# Patient Record
Sex: MALE | Race: WHITE | NOT HISPANIC OR LATINO
[De-identification: names, ages, dates, MRNs, and addresses within clinical notes are randomized per-mention and may not be internally consistent; named-entity substitution may affect disease eponyms.]

---

## 2021-03-22 PROBLEM — Z00.00 ENCOUNTER FOR PREVENTIVE HEALTH EXAMINATION: Status: ACTIVE | Noted: 2021-03-22

## 2021-03-29 ENCOUNTER — APPOINTMENT (OUTPATIENT)
Dept: OTOLARYNGOLOGY | Facility: CLINIC | Age: 56
End: 2021-03-29
Payer: COMMERCIAL

## 2021-03-29 VITALS
HEIGHT: 68 IN | BODY MASS INDEX: 27.28 KG/M2 | TEMPERATURE: 97.5 F | DIASTOLIC BLOOD PRESSURE: 72 MMHG | SYSTOLIC BLOOD PRESSURE: 107 MMHG | WEIGHT: 180 LBS

## 2021-03-29 DIAGNOSIS — Z86.79 PERSONAL HISTORY OF OTHER DISEASES OF THE CIRCULATORY SYSTEM: ICD-10-CM

## 2021-03-29 DIAGNOSIS — Z82.49 FAMILY HISTORY OF ISCHEMIC HEART DISEASE AND OTHER DISEASES OF THE CIRCULATORY SYSTEM: ICD-10-CM

## 2021-03-29 DIAGNOSIS — J34.2 DEVIATED NASAL SEPTUM: ICD-10-CM

## 2021-03-29 DIAGNOSIS — H91.93 UNSPECIFIED HEARING LOSS, BILATERAL: ICD-10-CM

## 2021-03-29 DIAGNOSIS — Z86.711 PERSONAL HISTORY OF PULMONARY EMBOLISM: ICD-10-CM

## 2021-03-29 DIAGNOSIS — H90.5 UNSPECIFIED SENSORINEURAL HEARING LOSS: ICD-10-CM

## 2021-03-29 DIAGNOSIS — H60.61 UNSPECIFIED CHRONIC OTITIS EXTERNA, RIGHT EAR: ICD-10-CM

## 2021-03-29 DIAGNOSIS — J31.0 CHRONIC RHINITIS: ICD-10-CM

## 2021-03-29 DIAGNOSIS — Z78.9 OTHER SPECIFIED HEALTH STATUS: ICD-10-CM

## 2021-03-29 DIAGNOSIS — Z83.3 FAMILY HISTORY OF DIABETES MELLITUS: ICD-10-CM

## 2021-03-29 PROCEDURE — 92557 COMPREHENSIVE HEARING TEST: CPT

## 2021-03-29 PROCEDURE — 99072 ADDL SUPL MATRL&STAF TM PHE: CPT

## 2021-03-29 PROCEDURE — 99203 OFFICE O/P NEW LOW 30 MIN: CPT | Mod: 25

## 2021-03-29 PROCEDURE — 92550 TYMPANOMETRY & REFLEX THRESH: CPT

## 2021-03-29 RX ORDER — ROSUVASTATIN CALCIUM 5 MG/1
5 TABLET, FILM COATED ORAL
Qty: 60 | Refills: 0 | Status: ACTIVE | COMMUNITY
Start: 2021-02-02

## 2021-03-29 NOTE — ASSESSMENT
[FreeTextEntry1] : Reviewed and reconciled medications, allergies, PMHx, PSHx, SocHx, FMHx.\par \par progressive hearing loss\par subcutaneous growth right anterior canal wall\par \par Audio: bilateral mild SNHL 250-2k hz rising back to WNL, right 100% discrimination at 70 db, left 100% discrimination at 65 db, right -54, left -20\par \par Plan:\par Audio - results interpreted by Dr. Caballero and reviewed with the patient. Consider amplification. Yearly audio recheck.

## 2021-03-29 NOTE — CONSULT LETTER
[Dear  ___] : Dear  [unfilled], [Consult Letter:] : I had the pleasure of evaluating your patient, [unfilled]. [Please see my note below.] : Please see my note below. [Consult Closing:] : Thank you very much for allowing me to participate in the care of this patient.  If you have any questions, please do not hesitate to contact me. [Sincerely,] : Sincerely, [FreeTextEntry3] : Jaydon Caballero MD FACS

## 2021-03-29 NOTE — PHYSICAL EXAM
[Hearing Nicholson Test (Tuning Fork On Forehead)] : no lateralization of tone [Midline] : trachea is located in midline position [Clear / Open well] : hypopharynx is clear and opens well [Removed] : previously removed [Normal] : no rashes [FreeTextEntry8] : cerumen removed via curettage, narrowing of the lateral canal, subcutaneous growth anterior canal wall [FreeTextEntry9] : minimal cerumen removed via curettage  [FreeTextEntry1] : uvula elongated [FreeTextEntry2] : sinuses nontender to percussion

## 2021-03-29 NOTE — ADDENDUM
[FreeTextEntry1] : Documented by Malu Glaser acting as scribe for Dr. Caballero on 03/29/2021.\par \par All Medical record entries made by the Scribe were at my, Dr. Caballero, direction and personally dictated by me on 03/29/2021 . I have reviewed the chart and agree that the record accurately reflects my personal performance of the history, physical exam, assessment and plan. I have also personally directed, reviewed, and agreed with the discharge instructions.

## 2021-03-29 NOTE — HISTORY OF PRESENT ILLNESS
[de-identified] : The patient presents with a progressive hearing loss. Pt states that his wife is complaining that he can't hear well for the past year. She told him she thinks it's getting progressively worse. The TV volume is up loud. Denies noticing a problem hearing in group situations. Denies any h/o ear problems, noise exposure, tinnitus, head trauma. FMHx of hearing loss with pt's grandmother at an older age.

## 2022-05-22 ENCOUNTER — TRANSCRIPTION ENCOUNTER (OUTPATIENT)
Age: 57
End: 2022-05-22

## 2022-05-23 ENCOUNTER — RESULT REVIEW (OUTPATIENT)
Age: 57
End: 2022-05-23

## 2022-05-23 ENCOUNTER — OUTPATIENT (OUTPATIENT)
Dept: OUTPATIENT SERVICES | Facility: HOSPITAL | Age: 57
LOS: 1 days | End: 2022-05-23
Payer: COMMERCIAL

## 2022-05-23 DIAGNOSIS — Z12.11 ENCOUNTER FOR SCREENING FOR MALIGNANT NEOPLASM OF COLON: ICD-10-CM

## 2022-05-23 PROCEDURE — 88305 TISSUE EXAM BY PATHOLOGIST: CPT

## 2022-05-23 PROCEDURE — 45380 COLONOSCOPY AND BIOPSY: CPT | Mod: PT

## 2022-05-23 PROCEDURE — 88305 TISSUE EXAM BY PATHOLOGIST: CPT | Mod: 26

## 2022-05-23 DEVICE — CLIP RESOLUTION 360 235CM: Type: IMPLANTABLE DEVICE | Status: FUNCTIONAL

## 2022-12-31 ENCOUNTER — RESULT CHARGE (OUTPATIENT)
Age: 57
End: 2022-12-31

## 2022-12-31 ENCOUNTER — APPOINTMENT (OUTPATIENT)
Dept: ORTHOPEDIC SURGERY | Facility: CLINIC | Age: 57
End: 2022-12-31
Payer: COMMERCIAL

## 2022-12-31 VITALS — WEIGHT: 185 LBS | HEIGHT: 68 IN | BODY MASS INDEX: 28.04 KG/M2

## 2022-12-31 PROCEDURE — 72100 X-RAY EXAM L-S SPINE 2/3 VWS: CPT

## 2022-12-31 PROCEDURE — 99203 OFFICE O/P NEW LOW 30 MIN: CPT

## 2022-12-31 RX ORDER — HYDROCODONE BITARTRATE AND ACETAMINOPHEN 5; 325 MG/1; MG/1
5-325 TABLET ORAL
Qty: 20 | Refills: 0 | Status: ACTIVE | COMMUNITY
Start: 2022-12-31 | End: 1900-01-01

## 2022-12-31 RX ORDER — NAPROXEN 500 MG/1
500 TABLET ORAL
Qty: 40 | Refills: 0 | Status: ACTIVE | COMMUNITY
Start: 2022-12-31 | End: 1900-01-01

## 2022-12-31 RX ORDER — CYCLOBENZAPRINE HYDROCHLORIDE 5 MG/1
5 TABLET, FILM COATED ORAL 3 TIMES DAILY
Qty: 30 | Refills: 0 | Status: ACTIVE | COMMUNITY
Start: 2022-12-31 | End: 1900-01-01

## 2022-12-31 NOTE — DISCUSSION/SUMMARY
[de-identified] : Options were discussed. He will take the Nap 500 BID,\par Flexeril and Norco PRN. Discussed SE (drowsiness etc, no ETOH while using)\par Heating pad\par WBAT\par f/u with [facility] spine in 3-4 weeks for repeat eval.\par All questions were answered.

## 2022-12-31 NOTE — PHYSICAL EXAM
[No bony abnormalities] : No bony abnormalities [de-identified] : Constitutional:  The patient appears well developed, well nourished.  Examination of patients ability to communicate functionally was normal. \par \par Neurologic: Coordination is normal.  Alert and oriented to time, place and person.  No evidence of mood disorder, calm affect. \par \par Back, including spine: Inspection of the thoracic/lumbar spine is as follows: No atrophy, erythema, ecchymosis, masses, skin discoloration, swelling and rashes. \par \par Palpation of the thoracic/lumbar spine is as follows: left lumbar paraspinal spasm, left lumbar paraspinal tenderness. no thoracic paraspinal spasm, no thoracic paraspinal tenderness, no tenderness over left sciatic nerve and no tenderness over right sciatic nerve. \par \par Range of motion of the thoracic and lumbar spine is as follows in degrees: Diminished range of motion in all planes and Pain with lateral rotation. pain at extremes of flexion, stiffness at extremes of flexion, pain at extremes extension, stiffness at extremes extentions, stiffness with bending to the right and stiffness with bending to the left. \par Lumbar Forward Flexion 10\par Lumbar Extension 5  \par \par \par Strength Testing of the thoracic and lumbar spine is as follows: motor exam is 5/5 throughout both lower extremities with normal tone and motor exam is non-focal throughout both lower extremities \par \par Special testing of the thoracic and lumbar spine is as follows: negative straight leg raise on left side, negative straight leg raise on right side, negative sitting straight leg raise on right and negative sitting straight leg raise on left \par \par Neurological testing of the thoracic and lumbar spine is as follows: light touch is intact throughout both lower extremities, achillies and patella reflexes are symetrical, \par \par Gait and function is as follows: mildly antalgic gait.

## 2022-12-31 NOTE — HISTORY OF PRESENT ILLNESS
[Sudden] : sudden [9] : 9 [3] : 3 [Localized] : localized [Throbbing] : throbbing [Rest] : rest [de-identified] : Patient c/o low back pain Left sided since playing pickelball yesterday. Denies prior low back pain,surgery/MRI.  Patient states over the past day pain has become worse and patient having difficulty bending forward.  Denies any paresthesias,weakness, or B/B incontinence.   [] : no [FreeTextEntry1] : L spine  [FreeTextEntry3] : 12/30/22  [FreeTextEntry5] : Pt states no injury has occurred but was playing pickle ball and afterwards started feeling pain.  [de-identified] : activity

## 2023-01-27 ENCOUNTER — APPOINTMENT (OUTPATIENT)
Dept: ORTHOPEDIC SURGERY | Facility: CLINIC | Age: 58
End: 2023-01-27
Payer: COMMERCIAL

## 2023-01-27 VITALS — WEIGHT: 185 LBS | HEIGHT: 68 IN | BODY MASS INDEX: 28.04 KG/M2

## 2023-01-27 DIAGNOSIS — S33.5XXA SPRAIN OF LIGAMENTS OF LUMBAR SPINE, INITIAL ENCOUNTER: ICD-10-CM

## 2023-01-27 PROCEDURE — 99214 OFFICE O/P EST MOD 30 MIN: CPT

## 2023-01-27 NOTE — IMAGING
[de-identified] : Spine:\par Inspection/Palpation:\par No tenderness to palpation throughout Cervical/thoracic/lumbar spine.\par No bony stepoffs, No lesions.\par \par Gait:\par Non-antalgic, able to perform bilateral toe and heel rise.  Able to perform tandem gait.  \par \par Range of Motion:\par Lumbar Spine: Flexion to 90 degrees, Extension to 30 degrees, rotation 30 degrees bilaterally, lateral flexion to 30 degrees bilaterally.\par \par Neurologic:\par \par Bilateral Lower Extremities 5/5 Iliopsoas/Quadriceps/Hamstrings/ Tibialis Anterior/ Gastrocnemius. Extensor Hallucis Longus/ Flexor Hallucis Longus except \par \par \par Sensation intact to light touch L2-S1\par \par Patellar/ Achilles reflex within normal limits.\par \par \par Negative straight leg raise\par \par No pain with IR/ER/Flexion of HIps bilaterally \par \par X-ray Ap/Lateral of lumbar spine from today were viewed and interpreted.  Normal alignment is maintained without any spondylolisthesis.

## 2023-01-27 NOTE — ASSESSMENT
[FreeTextEntry1] : 57 M with low back sprain strain.  mostly better but still has some pain/.stiffness with extremes of motion\par PT\par FU 2 months\par if pain persists MRI L spine \par NSAIDS PRN

## 2023-01-27 NOTE — HISTORY OF PRESENT ILLNESS
[Lower back] : lower back [Localized] : localized [Rest] : rest [1] : 2 [0] : 0 [Dull/Aching] : dull/aching [de-identified] : 01/27/2023: Patient is a 57 y.o M who presents today for a new injury visit for the lower back. States he has been having lower back pain for 1 month associated with playing pickle ball. Went to [facility] urgent care and had xrays done. States pain has improved  since last visit. Has been taking naproxen as needed for the pain.  [] : Post Surgical Visit: no [FreeTextEntry3] : 12/30/22

## 2023-03-24 ENCOUNTER — APPOINTMENT (OUTPATIENT)
Dept: ORTHOPEDIC SURGERY | Facility: CLINIC | Age: 58
End: 2023-03-24

## (undated) DEVICE — FORMALIN CUPS 10% BUFFERED

## (undated) DEVICE — CANISTER SUCTION 1200CC 10/SL

## (undated) DEVICE — CATH ELCTR GLIDE PRB 7FR

## (undated) DEVICE — PACK IV START WITH CHG

## (undated) DEVICE — VALVE BIOPSY

## (undated) DEVICE — RETRIEVER ROTH NET PLATINUM-UNIVERSAL

## (undated) DEVICE — SNARE POLYP SENS 27MM 240CM

## (undated) DEVICE — ELCTR GROUNDING PAD ADULT COVIDIEN

## (undated) DEVICE — SOL IRR POUR H2O 500ML

## (undated) DEVICE — TRAP QUICK CATCH  SINGL CHAMBER

## (undated) DEVICE — FORCEP RADIAL JAW 4 JUMBO 2.8MM 3.2MM 240CM ORANGE DISP

## (undated) DEVICE — SYR LUER SLIP TIP 30CC

## (undated) DEVICE — ENDOCUFF VISION SZ 2 LG GRN

## (undated) DEVICE — FORCEP RADIAL JAW 4 W NDL 2.4MM 2.8MM 240CM ORANGE DISP

## (undated) DEVICE — TUBING SUCTION CONN 6FT STERILE

## (undated) DEVICE — SYR IV POSIFLUSH NS 3ML 30/TY

## (undated) DEVICE — CLAMP BX HOT RAD JAW 3

## (undated) DEVICE — SUT HEWSON RETRIEVER

## (undated) DEVICE — TUBING CANNULA SALTER LABS NASAL ADULT 7FT

## (undated) DEVICE — TUBE RECTAL 24FR

## (undated) DEVICE — MASK OXYGEN PANORAMIC

## (undated) DEVICE — SUCTION YANKAUER TAPERED BULBOUS NO VENT

## (undated) DEVICE — CATH IV SAFE BC 22G X 1" (BLUE)

## (undated) DEVICE — Device

## (undated) DEVICE — CATH IV SAFE BC 20G X 1.16" (PINK)

## (undated) DEVICE — SET IV PUMP BLOOD 1VALVE 180FILTER NON-DEHP

## (undated) DEVICE — SYR LUER SLIP TIP 50CC

## (undated) DEVICE — TUBE O2 SUPL CRUSH RESIS CONN SOUTHSIDE ONLY

## (undated) DEVICE — SOL IRR NS 0.9% 250ML

## (undated) DEVICE — POLY TRAP ETRAP

## (undated) DEVICE — MARKER ENDO SPOT EX

## (undated) DEVICE — TUBING IV SET GRAVITY 3Y 100" MACRO

## (undated) DEVICE — NDL INJ SCLERO INTERJECT 23G

## (undated) DEVICE — STERIS DEFENDO 3-PIECE KIT (AIR/WATER, SUCTION & BIOPSY VALVES)

## (undated) DEVICE — BRUSH COLONOSCOPY CYTOLOGY

## (undated) DEVICE — TUBING ENDO EXT OLYMPUS 160 24HR USE

## (undated) DEVICE — CATH ELECHMSTAT  INJ 7FR 210CM

## (undated) DEVICE — TUBING IV SET SECONDARY 34"

## (undated) DEVICE — ENDOCUFF VISION SZ 3 SM PRPL

## (undated) DEVICE — SNARE LRG

## (undated) DEVICE — MASK O2 NON REBREATH 3IN1 ADULT

## (undated) DEVICE — SENS OXI DGT OXISENSOR II ADLN

## (undated) DEVICE — TRAP SPECIMEN SPUTUM 40CC